# Patient Record
Sex: MALE | Race: WHITE | NOT HISPANIC OR LATINO | Employment: OTHER | URBAN - METROPOLITAN AREA
[De-identification: names, ages, dates, MRNs, and addresses within clinical notes are randomized per-mention and may not be internally consistent; named-entity substitution may affect disease eponyms.]

---

## 2018-02-07 ENCOUNTER — ESTABLISHED COMPREHENSIVE EXAM (OUTPATIENT)
Dept: URBAN - METROPOLITAN AREA CLINIC 43 | Facility: CLINIC | Age: 83
End: 2018-02-07

## 2018-02-07 DIAGNOSIS — E11.9: ICD-10-CM

## 2018-02-07 DIAGNOSIS — Z96.1: ICD-10-CM

## 2018-02-07 DIAGNOSIS — H35.3130: ICD-10-CM

## 2018-02-07 PROCEDURE — 1036F TOBACCO NON-USER: CPT

## 2018-02-07 PROCEDURE — 4177F TALK PT/CRGVR RE AREDS PREV: CPT

## 2018-02-07 PROCEDURE — 2019F DILATED MACUL EXAM DONE: CPT

## 2018-02-07 PROCEDURE — G8427 DOCREV CUR MEDS BY ELIG CLIN: HCPCS

## 2018-02-07 PROCEDURE — 92014 COMPRE OPH EXAM EST PT 1/>: CPT

## 2018-02-07 PROCEDURE — 92015 DETERMINE REFRACTIVE STATE: CPT

## 2018-02-07 PROCEDURE — 2022F DILAT RTA XM EVC RTNOPTHY: CPT

## 2018-02-07 PROCEDURE — G8756 NO BP MEASURE DOC: HCPCS

## 2018-02-07 ASSESSMENT — VISUAL ACUITY
OU_SC: 20/30-1
OS_SC: 20/30-2
OD_BAT: 20/400
OS_BAT: 20/400
OU_SC: J10
OS_SC: J8
OD_SC: 20/40-2
OD_SC: J10

## 2018-02-07 ASSESSMENT — TONOMETRY
OD_IOP_MMHG: 18
OS_IOP_MMHG: 16

## 2018-08-22 NOTE — PATIENT DISCUSSION
The cataracts are creating some visual symptoms that are tolerable at this time. Update Rx. UV Rx advised.

## 2019-02-08 ENCOUNTER — ESTABLISHED COMPREHENSIVE EXAM (OUTPATIENT)
Dept: URBAN - METROPOLITAN AREA CLINIC 43 | Facility: CLINIC | Age: 84
End: 2019-02-08

## 2019-02-08 DIAGNOSIS — H53.2: ICD-10-CM

## 2019-02-08 DIAGNOSIS — H04.123: ICD-10-CM

## 2019-02-08 DIAGNOSIS — H35.3130: ICD-10-CM

## 2019-02-08 DIAGNOSIS — H43.813: ICD-10-CM

## 2019-02-08 DIAGNOSIS — Z01.00: ICD-10-CM

## 2019-02-08 DIAGNOSIS — H26.491: ICD-10-CM

## 2019-02-08 DIAGNOSIS — E11.9: ICD-10-CM

## 2019-02-08 PROCEDURE — 92015 DETERMINE REFRACTIVE STATE: CPT

## 2019-02-08 PROCEDURE — 92014 COMPRE OPH EXAM EST PT 1/>: CPT

## 2019-02-08 ASSESSMENT — VISUAL ACUITY
OU_SC: J10
OU_SC: 20/40
OS_SC: 20/40
OD_SC: 20/70-2
OS_SC: J10
OD_SC: J12

## 2019-02-08 ASSESSMENT — TONOMETRY
OD_IOP_MMHG: 13
OS_IOP_MMHG: 14

## 2019-02-19 ENCOUNTER — SURGERY/PROCEDURE (OUTPATIENT)
Dept: URBAN - METROPOLITAN AREA SURGERY 14 | Facility: SURGERY | Age: 84
End: 2019-02-19

## 2019-02-19 ENCOUNTER — CONSULT (OUTPATIENT)
Dept: URBAN - METROPOLITAN AREA CLINIC 39 | Facility: CLINIC | Age: 84
End: 2019-02-19

## 2019-02-19 DIAGNOSIS — H26.491: ICD-10-CM

## 2019-02-19 PROCEDURE — 92014 COMPRE OPH EXAM EST PT 1/>: CPT

## 2019-02-19 PROCEDURE — 66821 AFTER CATARACT LASER SURGERY: CPT

## 2019-02-19 ASSESSMENT — TONOMETRY
OD_IOP_MMHG: 16
OS_IOP_MMHG: 15

## 2019-02-19 ASSESSMENT — VISUAL ACUITY
OS_SC: 20/40
OD_SC: 20/70
OS_BAT: 20/100
OD_BAT: 20/100

## 2019-02-26 ENCOUNTER — YAG POST-OP (OUTPATIENT)
Dept: URBAN - METROPOLITAN AREA CLINIC 43 | Facility: CLINIC | Age: 84
End: 2019-02-26

## 2019-02-26 DIAGNOSIS — Z98.890: ICD-10-CM

## 2019-02-26 PROCEDURE — 99024 POSTOP FOLLOW-UP VISIT: CPT

## 2019-02-26 ASSESSMENT — VISUAL ACUITY
OS_SC: J6-
OD_SC: 20/40
OD_SC: J4
OS_SC: 20/40+2

## 2019-02-26 ASSESSMENT — TONOMETRY: OD_IOP_MMHG: 13

## 2019-03-27 ENCOUNTER — REFRACTION ONLY (OUTPATIENT)
Dept: URBAN - METROPOLITAN AREA CLINIC 43 | Facility: CLINIC | Age: 84
End: 2019-03-27

## 2019-03-27 DIAGNOSIS — H53.2: ICD-10-CM

## 2019-03-27 PROCEDURE — 99024 POSTOP FOLLOW-UP VISIT: CPT

## 2019-03-27 ASSESSMENT — VISUAL ACUITY
OS_CC: 20/40+1
OD_CC: 20/30-1

## 2020-02-26 ENCOUNTER — ESTABLISHED COMPREHENSIVE EXAM (OUTPATIENT)
Dept: URBAN - METROPOLITAN AREA CLINIC 43 | Facility: CLINIC | Age: 85
End: 2020-02-26

## 2020-02-26 DIAGNOSIS — E11.9: ICD-10-CM

## 2020-02-26 DIAGNOSIS — H02.886: ICD-10-CM

## 2020-02-26 DIAGNOSIS — H35.3130: ICD-10-CM

## 2020-02-26 DIAGNOSIS — H02.883: ICD-10-CM

## 2020-02-26 DIAGNOSIS — H04.123: ICD-10-CM

## 2020-02-26 DIAGNOSIS — Z01.00: ICD-10-CM

## 2020-02-26 DIAGNOSIS — H43.813: ICD-10-CM

## 2020-02-26 DIAGNOSIS — H53.2: ICD-10-CM

## 2020-02-26 PROCEDURE — 92015 DETERMINE REFRACTIVE STATE: CPT

## 2020-02-26 PROCEDURE — 92014 COMPRE OPH EXAM EST PT 1/>: CPT

## 2020-02-26 ASSESSMENT — VISUAL ACUITY
OD_SC: 20/40+1
OS_CC: J12
OD_SC: J10
OD_CC: J12
OS_SC: 20/30
OS_SC: J3
OD_CC: 20/30+2
OS_CC: 20/30

## 2020-02-26 ASSESSMENT — TONOMETRY
OD_IOP_MMHG: 13
OS_IOP_MMHG: 14

## 2021-03-02 ENCOUNTER — ESTABLISHED COMPREHENSIVE EXAM (OUTPATIENT)
Dept: URBAN - METROPOLITAN AREA CLINIC 43 | Facility: CLINIC | Age: 86
End: 2021-03-02

## 2021-03-02 DIAGNOSIS — H35.3130: ICD-10-CM

## 2021-03-02 DIAGNOSIS — E11.9: ICD-10-CM

## 2021-03-02 DIAGNOSIS — H02.886: ICD-10-CM

## 2021-03-02 DIAGNOSIS — H04.123: ICD-10-CM

## 2021-03-02 DIAGNOSIS — H02.883: ICD-10-CM

## 2021-03-02 DIAGNOSIS — H43.813: ICD-10-CM

## 2021-03-02 PROCEDURE — 92014 COMPRE OPH EXAM EST PT 1/>: CPT

## 2021-03-02 PROCEDURE — 92015 DETERMINE REFRACTIVE STATE: CPT

## 2021-03-02 ASSESSMENT — VISUAL ACUITY
OS_CC: 20/40-2
OD_SC: 20/70
OS_SC: J10
OD_SC: >J12
OS_SC: 20/40-1
OD_CC: 20/80-1

## 2021-03-02 ASSESSMENT — TONOMETRY
OS_IOP_MMHG: 9
OD_IOP_MMHG: 8

## 2022-03-02 ENCOUNTER — COMPREHENSIVE EXAM (OUTPATIENT)
Dept: URBAN - METROPOLITAN AREA CLINIC 43 | Facility: CLINIC | Age: 87
End: 2022-03-02

## 2022-03-02 DIAGNOSIS — H43.813: ICD-10-CM

## 2022-03-02 DIAGNOSIS — E11.9: ICD-10-CM

## 2022-03-02 DIAGNOSIS — H35.3130: ICD-10-CM

## 2022-03-02 DIAGNOSIS — H04.123: ICD-10-CM

## 2022-03-02 PROCEDURE — 92015 DETERMINE REFRACTIVE STATE: CPT

## 2022-03-02 PROCEDURE — 92014 COMPRE OPH EXAM EST PT 1/>: CPT

## 2022-03-02 ASSESSMENT — VISUAL ACUITY
OS_SC: CF 5FT
OS_SC: >J12
OD_CC: CF 6FT
OD_SC: >J12
OD_SC: CF 6FT
OU_CC: CF 6FT
OS_CC: CF 4FT

## 2022-03-02 ASSESSMENT — TONOMETRY
OD_IOP_MMHG: 14
OS_IOP_MMHG: 12